# Patient Record
Sex: FEMALE | Race: WHITE | NOT HISPANIC OR LATINO | Employment: STUDENT | URBAN - METROPOLITAN AREA
[De-identification: names, ages, dates, MRNs, and addresses within clinical notes are randomized per-mention and may not be internally consistent; named-entity substitution may affect disease eponyms.]

---
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
## 2024-09-27 ENCOUNTER — OFFICE VISIT (OUTPATIENT)
Dept: URGENT CARE | Facility: CLINIC | Age: 19
End: 2024-09-27
Payer: COMMERCIAL

## 2024-09-27 VITALS
BODY MASS INDEX: 19.67 KG/M2 | WEIGHT: 111 LBS | TEMPERATURE: 98 F | HEIGHT: 63 IN | DIASTOLIC BLOOD PRESSURE: 88 MMHG | SYSTOLIC BLOOD PRESSURE: 124 MMHG | HEART RATE: 99 BPM | RESPIRATION RATE: 16 BRPM | OXYGEN SATURATION: 94 %

## 2024-09-27 DIAGNOSIS — B96.89 BACTERIAL URI: Primary | ICD-10-CM
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
## 2024-09-27 DIAGNOSIS — J06.9 BACTERIAL URI: Primary | ICD-10-CM

## 2024-09-27 DIAGNOSIS — R09.89 RHONCHI AT BOTH LUNG BASES: ICD-10-CM

## 2024-09-27 DIAGNOSIS — R09.89 SYMPTOMS OF UPPER RESPIRATORY INFECTION (URI): ICD-10-CM

## 2024-09-27 LAB
CTP QC/QA: YES
CTP QC/QA: YES
MOLECULAR STREP A: NEGATIVE
SARS-COV-2 AG RESP QL IA.RAPID: NEGATIVE

## 2024-09-27 PROCEDURE — 99214 OFFICE O/P EST MOD 30 MIN: CPT | Mod: PBBFAC

## 2024-09-27 RX ORDER — LISDEXAMFETAMINE DIMESYLATE 40 MG/1
40 CAPSULE ORAL EVERY MORNING
COMMUNITY
Start: 2024-06-18

## 2024-09-27 RX ORDER — AMOXICILLIN AND CLAVULANATE POTASSIUM 875; 125 MG/1; MG/1
1 TABLET, FILM COATED ORAL EVERY 12 HOURS
Qty: 10 TABLET | Refills: 0 | Status: SHIPPED | OUTPATIENT
Start: 2024-09-27 | End: 2024-10-02

## 2024-09-27 RX ORDER — VALACYCLOVIR HYDROCHLORIDE 1 G/1
1000 TABLET, FILM COATED ORAL
COMMUNITY
Start: 2024-09-09

## 2024-09-27 RX ORDER — PREDNISONE 20 MG/1
TABLET ORAL
Qty: 15 TABLET | Refills: 0 | Status: SHIPPED | OUTPATIENT
Start: 2024-09-27 | End: 2024-10-05

## 2024-09-27 RX ORDER — MELOXICAM 15 MG/1
15 TABLET ORAL
COMMUNITY
Start: 2024-08-26

## 2024-09-27 RX ORDER — AZITHROMYCIN 250 MG/1
TABLET, FILM COATED ORAL
Qty: 6 TABLET | Refills: 0 | Status: SHIPPED | OUTPATIENT
Start: 2024-09-27 | End: 2024-10-02

## 2024-09-27 RX ORDER — DROSPIRENONE 4 MG/1
TABLET, FILM COATED ORAL
COMMUNITY

## 2024-09-27 RX ORDER — AMITRIPTYLINE HYDROCHLORIDE 25 MG/1
25 TABLET, FILM COATED ORAL NIGHTLY
COMMUNITY

## 2024-09-27 RX ORDER — METHOCARBAMOL 500 MG/1
500 TABLET, FILM COATED ORAL 2 TIMES DAILY PRN
COMMUNITY
Start: 2024-09-24

## 2024-09-27 RX ORDER — ALBUTEROL SULFATE 90 UG/1
2 INHALANT RESPIRATORY (INHALATION) EVERY 6 HOURS PRN
Qty: 18 G | Refills: 0 | Status: SHIPPED | OUTPATIENT
Start: 2024-09-27 | End: 2025-09-27

## 2024-09-27 NOTE — LETTER
September 27, 2024      [facility]  [facility] [address]  Phone: [phone]       Patient: Marizol Brenner   YOB: 2005  Date of Visit: 09/27/2024    To Whom It May Concern:    Mary oLu Brenner  was at [facility] on 09/27/2024. The patient may return to work/school on 09/30/2024 with no restrictions. If you have any questions or concerns, or if I can be of further assistance, please do not hesitate to contact me.    Sincerely,    GAB Damon

## 2024-09-28 NOTE — PROGRESS NOTES
"Subjective:       Patient ID: Marizol Brenner is a 19 y.o. female.    Vitals:  height is 5' 3" (1.6 m) and weight is 50.3 kg (111 lb). Her oral temperature is 97.9 °F (36.6 °C). Her blood pressure is 124/88 and her pulse is 99. Her respiration is 16 and oxygen saturation is 94% (abnormal).     Chief Complaint: Animal Bite (Multiple dog bites on hands. Patient reports trying to separate her dog and another dog and was bitten. She is not sure what dog bit her. Last Tetanus 7/24/2015) and URI (Cough, nasal congestion, sore throat and diarrhea x 2 days. O2 Sat 94%)    18 y/o white female presents to clinic alone, she reports dog bite to bilateral hands onset today @ 1700, reports neighbor's dogs (2) were attacking her dog when injury occurred, unsure of neighbor's dog vaccine hx, animal control not notified. Address of incident [address] [place] [address].  Also reports cold like symptoms x 2 days, states exposed to strep by co-worker. Has taken OTC cough medication with improvement.         Constitution: Positive for sweating. Negative for fever.   HENT:  Positive for congestion and sore throat.    Respiratory:  Positive for cough, sputum production (yellow/green) and wheezing. Negative for shortness of breath and asthma.    Allergic/Immunologic: Negative for asthma.   Neurological:  Negative for headaches.       Objective:      Physical Exam   Constitutional: She is oriented to person, place, and time. She is cooperative. She is easily aroused. She does not appear ill. underweightawake  HENT:   Head: Normocephalic and atraumatic.   Ears:   Right Ear: Tympanic membrane normal.   Left Ear: Tympanic membrane normal.   Nose: Mucosal edema and rhinorrhea present.   Mouth/Throat: Uvula is midline, oropharynx is clear and moist and mucous membranes are normal. No tonsillar exudate.   Eyes: Conjunctivae and lids are normal. Pupils are equal, round, and reactive to light.   Neck: Trachea normal and phonation normal. " Neck supple.   Cardiovascular: Tachycardia present.   Pulses:       Radial pulses are 2+ on the right side and 2+ on the left side.      Comments: Apical 115 beats per minute   Pulmonary/Chest: Effort normal. She has wheezes. She has rhonchi.   Abdominal: Normal appearance.   Musculoskeletal: Normal range of motion.         General: Signs of injury present. Normal range of motion.      Left hand: She exhibits laceration.   Lymphadenopathy:     She has no cervical adenopathy.   Neurological: She is alert, oriented to person, place, and time and easily aroused. Gait normal. GCS eye subscore is 4. GCS verbal subscore is 5. GCS motor subscore is 6.   Skin: Skin is warm, dry, intact and not diaphoretic. Capillary refill takes less than 2 seconds. Lacerations - upper ext.:  left hand        Comments: 1 cm laceration to dorsal aspect of right hand  Avulsion noted to dorsal left hand  Many abrasions bilateral hand   Psychiatric: Her speech is normal and behavior is normal.   Nursing note and vitals reviewed.        Assessment:       1. Bacterial URI    2. Symptoms of upper respiratory infection (URI)    3. Dog bite, initial encounter    4. Rhonchi at both lung bases          Plan:   Wound care treatment discussed for animal bites, we will prescribe course of Augmentin.      All testing is negative today in clinic, unfortunately Xray is unavailable tonight in clinic, we will presumably treat for possible CPAP.  Encouraged patient to monitor symptoms, when to seek ER discussed.  She appears stable for discharge.      Bacterial URI  -     azithromycin (Z-EZRA) 250 MG tablet; Take 2 tablets by mouth on day 1; Take 1 tablet by mouth on days 2-5  Dispense: 6 tablet; Refill: 0    Symptoms of upper respiratory infection (URI)  -     SARS Coronavirus 2 Antigen, POCT Manual Read  -     POCT Strep A, Molecular    Dog bite, initial encounter  -     Tdap (BOOSTRIX) vaccine injection 0.5 mL  -     amoxicillin-clavulanate 875-125mg  (AUGMENTIN) 875-125 mg per tablet; Take 1 tablet by mouth every 12 (twelve) hours. for 5 days  Dispense: 10 tablet; Refill: 0    Rhonchi at both lung bases  -     albuterol (VENTOLIN HFA) 90 mcg/actuation inhaler; Inhale 2 puffs into the lungs every 6 (six) hours as needed for Wheezing or Shortness of Breath. Rescue  Dispense: 18 g; Refill: 0  -     predniSONE (DELTASONE) 20 MG tablet; Take 1.5 tablets (30 mg total) by mouth 2 (two) times daily for 2 days, THEN 1 tablet (20 mg total) 2 (two) times daily for 3 days, THEN 0.5 tablets (10 mg total) 2 (two) times daily for 3 days.  Dispense: 15 tablet; Refill: 0           Results for orders placed or performed in visit on 09/27/24   SARS Coronavirus 2 Antigen, POCT Manual Read   Result Value Ref Range    SARS Coronavirus 2 Antigen Negative Negative     Acceptable Yes    POCT Strep A, Molecular   Result Value Ref Range    Molecular Strep A, POC Negative Negative     Acceptable Yes         Medical Decision Making:   Urgent Care Management:  Dog Incident reports to Emergency Dispatcher Service.

## 2024-09-28 NOTE — NURSING
VIS attached to After Visit Summary. Tdap vaccine administered to Left Deltoid using aseptic technique. Patient observed for 15 minutes for reactions, none noted. Patient tolerated well.

## 2025-06-26 ENCOUNTER — OFFICE VISIT (OUTPATIENT)
Dept: URGENT CARE | Facility: CLINIC | Age: 20
End: 2025-06-26
Payer: COMMERCIAL

## 2025-06-26 VITALS
HEART RATE: 86 BPM | OXYGEN SATURATION: 99 % | SYSTOLIC BLOOD PRESSURE: 112 MMHG | DIASTOLIC BLOOD PRESSURE: 73 MMHG | RESPIRATION RATE: 18 BRPM | HEIGHT: 63 IN | TEMPERATURE: 98 F | BODY MASS INDEX: 23.04 KG/M2 | WEIGHT: 130 LBS
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
## 2025-06-26 DIAGNOSIS — S00.262A INSECT BITE OF EYELID WITH LOCAL REACTION, LEFT, INITIAL ENCOUNTER: Primary | ICD-10-CM

## 2025-06-26 DIAGNOSIS — H02.89 IRRITATION OF EYELID: ICD-10-CM

## 2025-06-26 PROCEDURE — 99214 OFFICE O/P EST MOD 30 MIN: CPT | Mod: S$PBB,,, | Performed by: NURSE PRACTITIONER

## 2025-06-26 PROCEDURE — 99215 OFFICE O/P EST HI 40 MIN: CPT | Mod: PBBFAC | Performed by: NURSE PRACTITIONER

## 2025-06-26 RX ORDER — ERYTHROMYCIN 5 MG/G
OINTMENT OPHTHALMIC 3 TIMES DAILY
Qty: 3.5 G | Refills: 0 | Status: SHIPPED | OUTPATIENT
Start: 2025-06-26 | End: 2025-07-03

## 2025-06-26 RX ORDER — LEVOCETIRIZINE DIHYDROCHLORIDE 5 MG/1
5 TABLET, FILM COATED ORAL NIGHTLY
Qty: 14 TABLET | Refills: 0 | Status: SHIPPED | OUTPATIENT
Start: 2025-06-26

## 2025-06-26 RX ORDER — TIZANIDINE 4 MG/1
4 TABLET ORAL 2 TIMES DAILY PRN
COMMUNITY
Start: 2025-06-04

## 2025-06-26 RX ORDER — DICLOFENAC SODIUM 75 MG/1
75 TABLET, DELAYED RELEASE ORAL 2 TIMES DAILY
COMMUNITY
Start: 2025-06-04

## 2025-06-26 NOTE — PATIENT INSTRUCTIONS
Please follow instructions on patient education material.  Return to urgent care in 2 to 3 days if symptoms are not improving, immediately if you develop any new or worsening symptoms.     Start Rx meds today.  RTC for any worsening.  Warm compress applied to eye lid 5x/day.   Apply antibiotic ointment as prescribed.  Soak wash rag in warm to hot water, place on eye.   Remove when reaches room temp.   Clean towel every time.   If you need to rub/scratch your eye, use a tissue.  No contacts, makeup.   Any make up or false lashes worn in the last 3-7 days, throw away, buy new.  Advised no hydrogen peroxide, alcohol, or kitchen items on the rash or bump on lower eye lid.

## 2025-06-26 NOTE — PROGRESS NOTES
"Subjective:      Patient ID: Marizol Brenner is a 20 y.o. female.    Vitals:  height is 5' 3" (1.6 m) and weight is 59 kg (130 lb). Her temperature is 98.4 °F (36.9 °C). Her blood pressure is 112/73 and her pulse is 86. Her respiration is 18 and oxygen saturation is 99%.     Chief Complaint: Eye Problem (Pt c/o Left eye irritation x 2 days )    Eye Problem      Pt presents with left lower eye lid irritation after walking into a spider web yesterday. Pt states she does not recall being bit by the spider or seeing one but eye became irritation after rubbing it due to itching shortly after episode.  Patient reports mild discomfort with touch Pt denies visual disturbances, headache, dizziness, weakness, stomach pain, nausea or vomiting, dysuria  ROS   Objective:     Physical Exam   Constitutional: She is oriented to person, place, and time. She appears well-developed. She is cooperative.  Non-toxic appearance. She does not appear ill. No distress. awake  HENT:   Head: Normocephalic and atraumatic.   Nose: Nose normal. No purulent discharge. Right sinus exhibits no maxillary sinus tenderness and no frontal sinus tenderness. Left sinus exhibits no maxillary sinus tenderness and no frontal sinus tenderness.   Mouth/Throat: Uvula is midline.   Eyes: Conjunctivae are normal. Lids are everted and swept, no foreign bodies found. No visual field deficit is present. Right eye exhibits no discharge. Left eye exhibits no discharge. Right eye exhibits no nystagmus. Left eye exhibits normal extraocular motion and no nystagmus. Right pupil is round, reactive and not sluggish. Left pupil is round, reactive and not sluggish.     Extraocular movement intact vision grossly intact   Neck: Neck supple. No neck rigidity present.   Cardiovascular: Regular rhythm.   Pulmonary/Chest: Effort normal and breath sounds normal. No respiratory distress. She has no wheezes. She has no rhonchi. She has no rales.   Abdominal: Normal appearance. "   Lymphadenopathy:     She has no cervical adenopathy.   Neurological: She is alert and oriented to person, place, and time.   Skin: Skin is warm, dry and not diaphoretic. Capillary refill takes less than 2 seconds.   Psychiatric: Her behavior is normal. Mood, judgment and thought content normal.   Nursing note and vitals reviewed.      Assessment:     1. Insect bite of eyelid with local reaction, left, initial encounter    2. Irritation of eyelid      Vision Screening    Right eye Left eye Both eyes   Without correction 20/10 20/10 20/10   With correction          Plan:   ER precautions given and discussed  Prescribed Xyzal for suspected local reaction to insect bite and erythromycin ointment for possible insect bite  Warm compress applied to eye lid 5x/day.   Apply antibiotic ointment as prescribed.  Soak wash rag in warm to hot water, place on eye.   Remove when reaches room temp.   Clean towel every time.   If you need to rub/scratch your eye, use a tissue.  No contacts, makeup.   Any make up or false lashes worn in the last 3-7 days, throw away, buy new.  Advised no hydrogen peroxide, alcohol, or kitchen items on the rash or bump on lower eye lid.   Insect bite of eyelid with local reaction, left, initial encounter  -     erythromycin (ROMYCIN) ophthalmic ointment; Place into the left eye 3 (three) times daily. for 7 days  Dispense: 3.5 g; Refill: 0  -     levocetirizine (XYZAL) 5 MG tablet; Take 1 tablet (5 mg total) by mouth every evening.  Dispense: 14 tablet; Refill: 0    Irritation of eyelid

## 2025-06-26 NOTE — LETTER
June 26, 2025      [facility]  [address] [facility] [address]  Phone: [phone]       Patient: Marizol Brenner   YOB: 2005  Date of Visit: 06/26/2025    To Whom It May Concern:    Mary Lou Brenner  was at [facility] on 06/26/2025. The patient may return to work/school on 06/27/2025 with no restrictions. If you have any questions or concerns, or if I can be of further assistance, please do not hesitate to contact me.    Sincerely,      GAB Nelson